# Patient Record
Sex: FEMALE | Race: ASIAN | NOT HISPANIC OR LATINO | Employment: FULL TIME | URBAN - METROPOLITAN AREA
[De-identification: names, ages, dates, MRNs, and addresses within clinical notes are randomized per-mention and may not be internally consistent; named-entity substitution may affect disease eponyms.]

---

## 2017-01-23 DIAGNOSIS — R19.7 DIARRHEA, UNSPECIFIED TYPE: Primary | ICD-10-CM

## 2017-01-23 RX ORDER — CIPROFLOXACIN 500 MG/1
500 TABLET, FILM COATED ORAL 2 TIMES DAILY
Qty: 20 TABLET | Refills: 0 | Status: SHIPPED | OUTPATIENT
Start: 2017-01-23 | End: 2017-02-02

## 2018-03-25 RX ORDER — DESONIDE 0.5 MG/G
OINTMENT TOPICAL 3 TIMES DAILY
Qty: 60 G | Refills: 5 | Status: SHIPPED | OUTPATIENT
Start: 2018-03-25 | End: 2018-04-05 | Stop reason: SDUPTHER

## 2018-04-05 RX ORDER — DESONIDE 0.5 MG/G
OINTMENT TOPICAL 3 TIMES DAILY
Qty: 60 G | Refills: 5 | Status: SHIPPED | OUTPATIENT
Start: 2018-04-05 | End: 2020-09-21 | Stop reason: SDUPTHER

## 2018-05-17 LAB
ALBUMIN SERPL-MCNC: 4.8 G/DL (ref 3.5–5.5)
ALBUMIN/GLOB SERPL: 1.8 {RATIO} (ref 1.2–2.2)
ALP SERPL-CCNC: 49 IU/L (ref 39–117)
ALT SERPL-CCNC: 12 IU/L (ref 0–32)
AST SERPL-CCNC: 15 IU/L (ref 0–40)
BASOPHILS # BLD AUTO: 0 X10E3/UL (ref 0–0.2)
BASOPHILS NFR BLD AUTO: 1 %
BILIRUB SERPL-MCNC: 0.2 MG/DL (ref 0–1.2)
BUN SERPL-MCNC: 13 MG/DL (ref 6–20)
BUN/CREAT SERPL: 19 (ref 9–23)
CALCIUM SERPL-MCNC: 9.7 MG/DL (ref 8.7–10.2)
CHLORIDE SERPL-SCNC: 100 MMOL/L (ref 96–106)
CO2 SERPL-SCNC: 21 MMOL/L (ref 18–29)
CREAT SERPL-MCNC: 0.68 MG/DL (ref 0.57–1)
EOSINOPHIL # BLD AUTO: 0.2 X10E3/UL (ref 0–0.4)
EOSINOPHIL NFR BLD AUTO: 3 %
ERYTHROCYTE [DISTWIDTH] IN BLOOD BY AUTOMATED COUNT: 13.5 % (ref 12.3–15.4)
GFR SERPLBLD CREATININE-BSD FMLA CKD-EPI: 120 ML/MIN/1.73
GFR SERPLBLD CREATININE-BSD FMLA CKD-EPI: 139 ML/MIN/1.73
GLOBULIN SER CALC-MCNC: 2.7 G/DL (ref 1.5–4.5)
GLUCOSE SERPL-MCNC: 92 MG/DL (ref 65–99)
HCT VFR BLD AUTO: 43.6 % (ref 34–46.6)
HGB BLD-MCNC: 14.8 G/DL (ref 11.1–15.9)
IMM GRANULOCYTES # BLD: 0 X10E3/UL (ref 0–0.1)
IMM GRANULOCYTES NFR BLD: 0 %
LYMPHOCYTES # BLD AUTO: 2.1 X10E3/UL (ref 0.7–3.1)
LYMPHOCYTES NFR BLD AUTO: 30 %
MCH RBC QN AUTO: 30.8 PG (ref 26.6–33)
MCHC RBC AUTO-ENTMCNC: 33.9 G/DL (ref 31.5–35.7)
MCV RBC AUTO: 91 FL (ref 79–97)
MONOCYTES # BLD AUTO: 0.6 X10E3/UL (ref 0.1–0.9)
MONOCYTES NFR BLD AUTO: 8 %
NEUTROPHILS # BLD AUTO: 4.3 X10E3/UL (ref 1.4–7)
NEUTROPHILS NFR BLD AUTO: 58 %
PLATELET # BLD AUTO: 288 X10E3/UL (ref 150–379)
POTASSIUM SERPL-SCNC: 4.3 MMOL/L (ref 3.5–5.2)
PROT SERPL-MCNC: 7.5 G/DL (ref 6–8.5)
RBC # BLD AUTO: 4.81 X10E6/UL (ref 3.77–5.28)
SODIUM SERPL-SCNC: 138 MMOL/L (ref 134–144)
T4 FREE SERPL-MCNC: 1.59 NG/DL (ref 0.82–1.77)
TSH SERPL DL<=0.005 MIU/L-ACNC: 1.78 UIU/ML (ref 0.45–4.5)
WBC # BLD AUTO: 7.2 X10E3/UL (ref 3.4–10.8)

## 2020-09-21 RX ORDER — DESONIDE 0.5 MG/G
OINTMENT TOPICAL 3 TIMES DAILY
Qty: 60 G | Refills: 1 | Status: SHIPPED | OUTPATIENT
Start: 2020-09-21 | End: 2020-11-25

## 2022-01-14 RX ORDER — LEVOFLOXACIN 500 MG/1
500 TABLET, FILM COATED ORAL DAILY
Qty: 10 TABLET | Refills: 0 | Status: SHIPPED | OUTPATIENT
Start: 2022-01-14 | End: 2022-01-24

## 2022-01-14 RX ORDER — LEVOFLOXACIN 500 MG/1
500 TABLET, FILM COATED ORAL DAILY
Qty: 10 TABLET | Refills: 0 | Status: SHIPPED | OUTPATIENT
Start: 2022-01-14 | End: 2022-01-14 | Stop reason: SDUPTHER

## 2022-01-14 RX ORDER — OFLOXACIN 3 MG/ML
SOLUTION/ DROPS OPHTHALMIC
Qty: 10 ML | Refills: 0 | Status: SHIPPED | OUTPATIENT
Start: 2022-01-14 | End: 2022-03-08

## 2022-01-14 RX ORDER — OFLOXACIN 3 MG/ML
SOLUTION/ DROPS OPHTHALMIC
Qty: 10 ML | Refills: 0 | Status: SHIPPED | OUTPATIENT
Start: 2022-01-14 | End: 2022-01-14 | Stop reason: SDUPTHER

## 2022-03-08 ENCOUNTER — OFFICE VISIT (OUTPATIENT)
Dept: FAMILY MEDICINE CLINIC | Facility: CLINIC | Age: 32
End: 2022-03-08

## 2022-03-08 VITALS
WEIGHT: 112 LBS | TEMPERATURE: 98.1 F | OXYGEN SATURATION: 100 % | BODY MASS INDEX: 21.14 KG/M2 | HEART RATE: 75 BPM | SYSTOLIC BLOOD PRESSURE: 144 MMHG | DIASTOLIC BLOOD PRESSURE: 82 MMHG | HEIGHT: 61 IN

## 2022-03-08 DIAGNOSIS — Z13.0 SCREENING, ANEMIA, DEFICIENCY, IRON: ICD-10-CM

## 2022-03-08 DIAGNOSIS — Z13.1 SCREENING FOR DIABETES MELLITUS: ICD-10-CM

## 2022-03-08 DIAGNOSIS — K76.89 FOCAL NODULAR HYPERPLASIA OF LIVER: ICD-10-CM

## 2022-03-08 DIAGNOSIS — Z13.220 SCREENING FOR LIPID DISORDERS: ICD-10-CM

## 2022-03-08 DIAGNOSIS — Z00.00 HEALTH MAINTENANCE EXAMINATION: Primary | ICD-10-CM

## 2022-03-08 DIAGNOSIS — Z13.29 SCREENING FOR THYROID DISORDER: ICD-10-CM

## 2022-03-08 DIAGNOSIS — Z11.59 ENCOUNTER FOR HEPATITIS C SCREENING TEST FOR LOW RISK PATIENT: ICD-10-CM

## 2022-03-08 PROBLEM — N83.202 BILATERAL OVARIAN CYSTS: Status: ACTIVE | Noted: 2022-03-08

## 2022-03-08 PROBLEM — L20.82 FLEXURAL ECZEMA: Status: ACTIVE | Noted: 2022-03-08

## 2022-03-08 PROBLEM — N83.201 BILATERAL OVARIAN CYSTS: Status: ACTIVE | Noted: 2022-03-08

## 2022-03-08 PROBLEM — L40.9 PSORIASIS: Status: ACTIVE | Noted: 2022-03-08

## 2022-03-08 PROCEDURE — 99395 PREV VISIT EST AGE 18-39: CPT | Performed by: FAMILY MEDICINE

## 2022-03-08 RX ORDER — CLOBETASOL PROPIONATE 0.5 MG/G
1 OINTMENT TOPICAL 2 TIMES DAILY
COMMUNITY
End: 2023-03-10

## 2022-03-08 NOTE — PROGRESS NOTES
"Chief Complaint  Establish Care (New pt establishing today with dr carrera ) and Annual Exam (No complains  doing well /)    Subjective          Tania FERGUSON presents to [facility]  History of Present Illness  Annual Exam.    Health Maintenance   Topic Date Due   • ANNUAL PHYSICAL  Never done   • HEPATITIS C SCREENING  Never done   • PAP SMEAR  01/20/2024   • TDAP/TD VACCINES (2 - Td or Tdap) 09/10/2031   • COVID-19 Vaccine  Completed   • INFLUENZA VACCINE  Completed   • Pneumococcal Vaccine 0-64  Aged Out       Diet: eats take out regularly   Exercise: started exercising now, barre work outs, About 20 min a day.   GYN: has an IUD in place, no abnormal paps, regular menses, has GYN   Immunizations: UTD  Colon cancer screening: UTD   Sleep/mental health: UTD   Dentist: UTD    Objective   Vital Signs:   /82   Pulse 75   Temp 98.1 °F (36.7 °C)   Ht 154.9 cm (61\")   Wt 50.8 kg (112 lb)   SpO2 100%   BMI 21.16 kg/m²     Physical Exam  Vitals and nursing note reviewed.   Constitutional:       General: She is not in acute distress.     Appearance: She is well-developed.   HENT:      Head: Normocephalic.      Right Ear: Tympanic membrane and ear canal normal.      Left Ear: Tympanic membrane and ear canal normal.      Ears:      Comments: Mild atopic dermatitis in the ear canals bilaterally.     Nose: Nose normal.   Cardiovascular:      Rate and Rhythm: Normal rate and regular rhythm.      Heart sounds: Normal heart sounds. No murmur heard.  Pulmonary:      Effort: Pulmonary effort is normal. No respiratory distress.      Breath sounds: Normal breath sounds.   Abdominal:      General: Abdomen is flat. Bowel sounds are normal. There is no distension.      Palpations: Abdomen is soft.      Tenderness: There is no abdominal tenderness.   Musculoskeletal:         General: Normal range of motion.   Skin:     General: Skin is warm and dry.      Findings: No rash.   Neurological:      " Mental Status: She is alert and oriented to person, place, and time.   Psychiatric:         Behavior: Behavior normal.         Thought Content: Thought content normal.         Judgment: Judgment normal.        Result Review :                 Assessment and Plan    Diagnoses and all orders for this visit:    1. Health maintenance examination (Primary)    2. Screening for diabetes mellitus  -     Comprehensive Metabolic Panel    3. Screening for lipid disorders  -     Lipid Panel    4. Screening for thyroid disorder  -     TSH Rfx On Abnormal To Free T4    5. Encounter for hepatitis C screening test for low risk patient  -     Hepatitis C Antibody    6. Screening, anemia, deficiency, iron  -     CBC & Differential    7. Focal nodular hyperplasia of liver  Assessment & Plan:  Started havving symptoms of RUQ abdominal, dark tarry stools in 2015, she has saw Dr Conner -underwent colonoscopy and EGD in 2014, MRI of the abdomen showing for hepatic lesions in 2015, her last scan was April 18, 2016.  The lesions were stable, demonstrating focal nodular hyperplasia.  Attributed to oral contraceptives, since stopping the OCPs her symptoms have resolved.  Now using Francisca for contraception.    Plan:   -Observe for recurrent symptoms, reimage if they return.  -Avoid OCPs as this was likely cause.      Here for annual exam, fasting labs ordered today, immunizations up-to-date, colon cancer screening not indicated, GYN health UTD, cardiovascular screening  UTD, counseled patient to increase vegetable intake, water and 150 minutes of exercise weekly combining weightbearing exercises and aerobic activity.    Her blood pressure was mildly elevated today, states that it normally is elevated in the doctor's office but better at home.  Continue to observe for now.    Follow Up   Return if symptoms worsen or fail to improve, for Annual Exam with fasting labs prior.  Patient was given instructions and counseling regarding her condition or  for health maintenance advice. Please see specific information pulled into the AVS if appropriate. Medical assistant and I wore mask and eyewear protection during entire encounter.  Patient wore mask.    Gloria Barajas MD

## 2022-03-08 NOTE — ASSESSMENT & PLAN NOTE
Started havving symptoms of RUQ abdominal, dark tarry stools in 2015, she has saw Dr Conner -underwent colonoscopy and EGD in 2014, MRI of the abdomen showing for hepatic lesions in 2015, her last scan was April 18, 2016.  The lesions were stable, demonstrating focal nodular hyperplasia.  Attributed to oral contraceptives, since stopping the OCPs her symptoms have resolved.  Now using Francisca for contraception.    Plan:   -Observe for recurrent symptoms, reimage if they return.  -Avoid OCPs as this was likely cause.

## 2022-03-08 NOTE — ASSESSMENT & PLAN NOTE
She has had allergy teesting, elimination testing and found she is allergic to haroldo and formaldehyde and that has helped.  Derm: [facility]

## 2022-03-09 LAB
ALBUMIN SERPL-MCNC: 5 G/DL (ref 3.8–4.8)
ALBUMIN/GLOB SERPL: 1.9 {RATIO} (ref 1.2–2.2)
ALP SERPL-CCNC: 51 IU/L (ref 44–121)
ALT SERPL-CCNC: 13 IU/L (ref 0–32)
AST SERPL-CCNC: 18 IU/L (ref 0–40)
BASOPHILS # BLD AUTO: 0.1 X10E3/UL (ref 0–0.2)
BASOPHILS NFR BLD AUTO: 1 %
BILIRUB SERPL-MCNC: 0.5 MG/DL (ref 0–1.2)
BUN SERPL-MCNC: 11 MG/DL (ref 6–20)
BUN/CREAT SERPL: 16 (ref 9–23)
CALCIUM SERPL-MCNC: 10.2 MG/DL (ref 8.7–10.2)
CHLORIDE SERPL-SCNC: 103 MMOL/L (ref 96–106)
CHOLEST SERPL-MCNC: 194 MG/DL (ref 100–199)
CO2 SERPL-SCNC: 23 MMOL/L (ref 20–29)
CREAT SERPL-MCNC: 0.69 MG/DL (ref 0.57–1)
EGFR GENE MUT ANL BLD/T: 119 ML/MIN/1.73
EOSINOPHIL # BLD AUTO: 0.2 X10E3/UL (ref 0–0.4)
EOSINOPHIL NFR BLD AUTO: 2 %
ERYTHROCYTE [DISTWIDTH] IN BLOOD BY AUTOMATED COUNT: 12.7 % (ref 11.7–15.4)
GLOBULIN SER CALC-MCNC: 2.6 G/DL (ref 1.5–4.5)
GLUCOSE SERPL-MCNC: 95 MG/DL (ref 65–99)
HCT VFR BLD AUTO: 45 % (ref 34–46.6)
HCV AB S/CO SERPL IA: <0.1 S/CO RATIO (ref 0–0.9)
HDLC SERPL-MCNC: 87 MG/DL
HGB BLD-MCNC: 15.1 G/DL (ref 11.1–15.9)
IMM GRANULOCYTES # BLD AUTO: 0 X10E3/UL (ref 0–0.1)
IMM GRANULOCYTES NFR BLD AUTO: 1 %
LDLC SERPL CALC-MCNC: 91 MG/DL (ref 0–99)
LYMPHOCYTES # BLD AUTO: 1.6 X10E3/UL (ref 0.7–3.1)
LYMPHOCYTES NFR BLD AUTO: 26 %
MCH RBC QN AUTO: 31 PG (ref 26.6–33)
MCHC RBC AUTO-ENTMCNC: 33.6 G/DL (ref 31.5–35.7)
MCV RBC AUTO: 92 FL (ref 79–97)
MONOCYTES # BLD AUTO: 0.4 X10E3/UL (ref 0.1–0.9)
MONOCYTES NFR BLD AUTO: 6 %
NEUTROPHILS # BLD AUTO: 4 X10E3/UL (ref 1.4–7)
NEUTROPHILS NFR BLD AUTO: 64 %
PLATELET # BLD AUTO: 282 X10E3/UL (ref 150–450)
POTASSIUM SERPL-SCNC: 5.3 MMOL/L (ref 3.5–5.2)
PROT SERPL-MCNC: 7.6 G/DL (ref 6–8.5)
RBC # BLD AUTO: 4.87 X10E6/UL (ref 3.77–5.28)
SODIUM SERPL-SCNC: 140 MMOL/L (ref 134–144)
TRIGL SERPL-MCNC: 93 MG/DL (ref 0–149)
TSH SERPL DL<=0.005 MIU/L-ACNC: 1.21 UIU/ML (ref 0.45–4.5)
VLDLC SERPL CALC-MCNC: 16 MG/DL (ref 5–40)
WBC # BLD AUTO: 6.3 X10E3/UL (ref 3.4–10.8)

## 2022-03-17 ENCOUNTER — PATIENT ROUNDING (BHMG ONLY) (OUTPATIENT)
Dept: FAMILY MEDICINE CLINIC | Facility: CLINIC | Age: 32
End: 2022-03-17

## 2022-03-28 NOTE — PROGRESS NOTES
Please call patient with results. Kidney and liver functions look normal. Diabetes screen is negative. Cholesterol is normal.  Normal cholesterol, thyroid function, blood counts and negative hepatitis C screen.     Your potassium is slightly elevated.  I would stop any potassium supplement, possibly limit intake of some foods that are high in potassium.      Please continue with routine follow-up and let us know if you have further questions.      Thank You,    Gloria Barajas M.D.

## 2022-05-25 ENCOUNTER — TELEPHONE (OUTPATIENT)
Dept: FAMILY MEDICINE CLINIC | Facility: CLINIC | Age: 32
End: 2022-05-25

## 2023-03-10 ENCOUNTER — OFFICE VISIT (OUTPATIENT)
Dept: FAMILY MEDICINE CLINIC | Facility: CLINIC | Age: 33
End: 2023-03-10
Payer: COMMERCIAL

## 2023-03-10 VITALS
BODY MASS INDEX: 21.71 KG/M2 | OXYGEN SATURATION: 99 % | TEMPERATURE: 97.8 F | HEIGHT: 61 IN | WEIGHT: 115 LBS | SYSTOLIC BLOOD PRESSURE: 110 MMHG | HEART RATE: 70 BPM | DIASTOLIC BLOOD PRESSURE: 70 MMHG

## 2023-03-10 DIAGNOSIS — Z13.29 SCREENING FOR THYROID DISORDER: ICD-10-CM

## 2023-03-10 DIAGNOSIS — Z00.00 HEALTH MAINTENANCE EXAMINATION: Primary | ICD-10-CM

## 2023-03-10 DIAGNOSIS — Z13.1 SCREENING FOR DIABETES MELLITUS: ICD-10-CM

## 2023-03-10 DIAGNOSIS — Z13.0 SCREENING, ANEMIA, DEFICIENCY, IRON: ICD-10-CM

## 2023-03-10 DIAGNOSIS — Z13.220 SCREENING FOR LIPID DISORDERS: ICD-10-CM

## 2023-03-10 PROCEDURE — 99395 PREV VISIT EST AGE 18-39: CPT | Performed by: FAMILY MEDICINE

## 2023-03-10 NOTE — PROGRESS NOTES
"Chief Complaint  Annual Exam (No complains doing well  feel skin is getting lot dryer specially on face )    Subjective        Tania FERGUSON presents to [facility]  History of Present Illness  Annual Exam.    Health Maintenance   Topic Date Due   • INFLUENZA VACCINE  08/01/2022   • ANNUAL PHYSICAL  03/08/2023   • PAP SMEAR  01/20/2024   • TDAP/TD VACCINES (2 - Td or Tdap) 09/10/2031   • HEPATITIS C SCREENING  Completed   • COVID-19 Vaccine  Completed   • Pneumococcal Vaccine 0-64  Aged Out     Diet: eating well  Exercise: exercising   GYN: irregular menses with GYN, had IUD - Francisca   Immunizations: UTD   Colon cancer screening: Due at 45 (she did have a scope 2./2 rectal bleeding that was normal)  Sleep/mental health: doing well, some some stress with work, but otherwise doing well.  Dentist: UTD  Vision no concerns up-to-date  Derm: On Dupixent now ans helping her skin.  Sees her Derm    Objective   Vital Signs:  /70   Pulse 70   Temp 97.8 °F (36.6 °C)   Ht 154.9 cm (61\")   Wt 52.2 kg (115 lb)   SpO2 99%   BMI 21.73 kg/m²   Estimated body mass index is 21.73 kg/m² as calculated from the following:    Height as of this encounter: 154.9 cm (61\").    Weight as of this encounter: 52.2 kg (115 lb).       BMI is within normal parameters. No other follow-up for BMI required.      Physical Exam  Vitals and nursing note reviewed.   Constitutional:       General: She is not in acute distress.     Appearance: She is well-developed.   HENT:      Head: Normocephalic.      Nose: Nose normal.   Cardiovascular:      Rate and Rhythm: Normal rate and regular rhythm.      Heart sounds: Normal heart sounds. No murmur heard.  Pulmonary:      Effort: Pulmonary effort is normal. No respiratory distress.      Breath sounds: Normal breath sounds.   Musculoskeletal:         General: Normal range of motion.   Skin:     General: Skin is warm and dry.      Findings: No rash.   Neurological:      " Mental Status: She is alert and oriented to person, place, and time.   Psychiatric:         Behavior: Behavior normal.         Thought Content: Thought content normal.         Judgment: Judgment normal.        Result Review :                   Assessment and Plan   Diagnoses and all orders for this visit:    1. Health maintenance examination (Primary)    2. Screening for diabetes mellitus  -     Comprehensive Metabolic Panel    3. Screening for lipid disorders  -     Lipid Panel    4. Screening for thyroid disorder  -     TSH Rfx On Abnormal To Free T4    5. Screening, anemia, deficiency, iron  -     CBC & Differential      Here for annual exam, fasting labs ordered as above, immunizations up-to-date, colon cancer screening due at 45, she has had 1 colonoscopy which was normal due to some rectal bleeding years ago, GYN health up-to-date with her gynecologist Dr. Agustin, IUD in place for contraception, she may want to start considering pregnancy again this year, cardiovascular screening up-to-date and no concerns, counseled patient to increase vegetable intake, water and 150 minutes of exercise weekly combining weightbearing exercises and aerobic activity.         Follow Up   Return in about 1 year (around 3/10/2024), or if symptoms worsen or fail to improve, for Annual Physical with fasting labs prior.  Patient was given instructions and counseling regarding her condition or for health maintenance advice. Please see specific information pulled into the AVS if appropriate. Medical assistant and I wore mask and eyewear protection during entire encounter.  Patient wore mask.    Gloria Herbert MD

## 2023-03-11 LAB
ALBUMIN SERPL-MCNC: 4.8 G/DL (ref 3.5–5.2)
ALBUMIN/GLOB SERPL: 1.9 G/DL
ALP SERPL-CCNC: 52 U/L (ref 39–117)
ALT SERPL-CCNC: 9 U/L (ref 1–33)
AST SERPL-CCNC: 19 U/L (ref 1–32)
BASOPHILS # BLD AUTO: 0.05 10*3/MM3 (ref 0–0.2)
BASOPHILS NFR BLD AUTO: 0.9 % (ref 0–1.5)
BILIRUB SERPL-MCNC: 0.3 MG/DL (ref 0–1.2)
BUN SERPL-MCNC: 12 MG/DL (ref 6–20)
BUN/CREAT SERPL: 19.7 (ref 7–25)
CALCIUM SERPL-MCNC: 9.5 MG/DL (ref 8.6–10.5)
CHLORIDE SERPL-SCNC: 101 MMOL/L (ref 98–107)
CHOLEST SERPL-MCNC: 188 MG/DL (ref 0–200)
CO2 SERPL-SCNC: 26 MMOL/L (ref 22–29)
CREAT SERPL-MCNC: 0.61 MG/DL (ref 0.57–1)
EGFRCR SERPLBLD CKD-EPI 2021: 122 ML/MIN/1.73
EOSINOPHIL # BLD AUTO: 0.08 10*3/MM3 (ref 0–0.4)
EOSINOPHIL NFR BLD AUTO: 1.4 % (ref 0.3–6.2)
ERYTHROCYTE [DISTWIDTH] IN BLOOD BY AUTOMATED COUNT: 12.5 % (ref 12.3–15.4)
GLOBULIN SER CALC-MCNC: 2.5 GM/DL
GLUCOSE SERPL-MCNC: 98 MG/DL (ref 65–99)
HCT VFR BLD AUTO: 43.2 % (ref 34–46.6)
HDLC SERPL-MCNC: 79 MG/DL (ref 40–60)
HGB BLD-MCNC: 14.8 G/DL (ref 12–15.9)
IMM GRANULOCYTES # BLD AUTO: 0.01 10*3/MM3 (ref 0–0.05)
IMM GRANULOCYTES NFR BLD AUTO: 0.2 % (ref 0–0.5)
LDLC SERPL CALC-MCNC: 93 MG/DL (ref 0–100)
LYMPHOCYTES # BLD AUTO: 1.59 10*3/MM3 (ref 0.7–3.1)
LYMPHOCYTES NFR BLD AUTO: 27.1 % (ref 19.6–45.3)
MCH RBC QN AUTO: 31.8 PG (ref 26.6–33)
MCHC RBC AUTO-ENTMCNC: 34.3 G/DL (ref 31.5–35.7)
MCV RBC AUTO: 92.7 FL (ref 79–97)
MONOCYTES # BLD AUTO: 0.4 10*3/MM3 (ref 0.1–0.9)
MONOCYTES NFR BLD AUTO: 6.8 % (ref 5–12)
NEUTROPHILS # BLD AUTO: 3.73 10*3/MM3 (ref 1.7–7)
NEUTROPHILS NFR BLD AUTO: 63.6 % (ref 42.7–76)
NRBC BLD AUTO-RTO: 0 /100 WBC (ref 0–0.2)
PLATELET # BLD AUTO: 251 10*3/MM3 (ref 140–450)
POTASSIUM SERPL-SCNC: 5 MMOL/L (ref 3.5–5.2)
PROT SERPL-MCNC: 7.3 G/DL (ref 6–8.5)
RBC # BLD AUTO: 4.66 10*6/MM3 (ref 3.77–5.28)
SODIUM SERPL-SCNC: 137 MMOL/L (ref 136–145)
TRIGL SERPL-MCNC: 91 MG/DL (ref 0–150)
TSH SERPL DL<=0.005 MIU/L-ACNC: 0.99 UIU/ML (ref 0.27–4.2)
VLDLC SERPL CALC-MCNC: 16 MG/DL (ref 5–40)
WBC # BLD AUTO: 5.86 10*3/MM3 (ref 3.4–10.8)

## 2023-03-16 NOTE — PROGRESS NOTES
Spoke to Tania FERGUSON about results. Patient voiced understanding and will call if they have any questions.

## 2023-03-16 NOTE — PROGRESS NOTES
Please call patient back with results.  The labs has resulted as normal kidney and liver function, excellent cholesterol and thyroid.  No abnormal findings continue with healthful living and follow-up in 1 year.  Please let us know if you have further questions.     Thank you

## 2024-03-15 ENCOUNTER — OFFICE VISIT (OUTPATIENT)
Dept: FAMILY MEDICINE CLINIC | Facility: CLINIC | Age: 34
End: 2024-03-15
Payer: COMMERCIAL

## 2024-03-15 VITALS
HEIGHT: 61 IN | HEART RATE: 55 BPM | WEIGHT: 112 LBS | SYSTOLIC BLOOD PRESSURE: 110 MMHG | OXYGEN SATURATION: 99 % | DIASTOLIC BLOOD PRESSURE: 70 MMHG | TEMPERATURE: 98 F | BODY MASS INDEX: 21.14 KG/M2

## 2024-03-15 DIAGNOSIS — Z13.29 SCREENING FOR THYROID DISORDER: ICD-10-CM

## 2024-03-15 DIAGNOSIS — Z00.00 HEALTH MAINTENANCE EXAMINATION: Primary | ICD-10-CM

## 2024-03-15 DIAGNOSIS — L30.9 DERMATITIS: ICD-10-CM

## 2024-03-15 DIAGNOSIS — Z13.1 SCREENING FOR DIABETES MELLITUS: ICD-10-CM

## 2024-03-15 DIAGNOSIS — Z13.0 SCREENING, ANEMIA, DEFICIENCY, IRON: ICD-10-CM

## 2024-03-15 DIAGNOSIS — Z13.220 SCREENING FOR LIPID DISORDERS: ICD-10-CM

## 2024-03-15 LAB
ALBUMIN SERPL-MCNC: 4.5 G/DL (ref 3.5–5.2)
ALBUMIN/GLOB SERPL: 2 G/DL
ALP SERPL-CCNC: 40 U/L (ref 39–117)
ALT SERPL-CCNC: 13 U/L (ref 1–33)
AST SERPL-CCNC: 14 U/L (ref 1–32)
BASOPHILS # BLD AUTO: 0.04 10*3/MM3 (ref 0–0.2)
BASOPHILS NFR BLD AUTO: 0.7 % (ref 0–1.5)
BILIRUB SERPL-MCNC: 0.5 MG/DL (ref 0–1.2)
BUN SERPL-MCNC: 11 MG/DL (ref 6–20)
BUN/CREAT SERPL: 14.1 (ref 7–25)
CALCIUM SERPL-MCNC: 9.2 MG/DL (ref 8.6–10.5)
CHLORIDE SERPL-SCNC: 105 MMOL/L (ref 98–107)
CHOLEST SERPL-MCNC: 161 MG/DL (ref 0–200)
CO2 SERPL-SCNC: 22.3 MMOL/L (ref 22–29)
CREAT SERPL-MCNC: 0.78 MG/DL (ref 0.57–1)
EGFRCR SERPLBLD CKD-EPI 2021: 103 ML/MIN/1.73
EOSINOPHIL # BLD AUTO: 0.07 10*3/MM3 (ref 0–0.4)
EOSINOPHIL NFR BLD AUTO: 1.2 % (ref 0.3–6.2)
ERYTHROCYTE [DISTWIDTH] IN BLOOD BY AUTOMATED COUNT: 12.1 % (ref 12.3–15.4)
GLOBULIN SER CALC-MCNC: 2.3 GM/DL
GLUCOSE SERPL-MCNC: 100 MG/DL (ref 65–99)
HCT VFR BLD AUTO: 40.4 % (ref 34–46.6)
HDLC SERPL-MCNC: 72 MG/DL (ref 40–60)
HGB BLD-MCNC: 13.6 G/DL (ref 12–15.9)
IMM GRANULOCYTES # BLD AUTO: 0.01 10*3/MM3 (ref 0–0.05)
IMM GRANULOCYTES NFR BLD AUTO: 0.2 % (ref 0–0.5)
LDLC SERPL CALC-MCNC: 72 MG/DL (ref 0–100)
LYMPHOCYTES # BLD AUTO: 1.53 10*3/MM3 (ref 0.7–3.1)
LYMPHOCYTES NFR BLD AUTO: 25.8 % (ref 19.6–45.3)
MCH RBC QN AUTO: 31.1 PG (ref 26.6–33)
MCHC RBC AUTO-ENTMCNC: 33.7 G/DL (ref 31.5–35.7)
MCV RBC AUTO: 92.4 FL (ref 79–97)
MONOCYTES # BLD AUTO: 0.47 10*3/MM3 (ref 0.1–0.9)
MONOCYTES NFR BLD AUTO: 7.9 % (ref 5–12)
NEUTROPHILS # BLD AUTO: 3.82 10*3/MM3 (ref 1.7–7)
NEUTROPHILS NFR BLD AUTO: 64.2 % (ref 42.7–76)
NRBC BLD AUTO-RTO: 0 /100 WBC (ref 0–0.2)
PLATELET # BLD AUTO: 244 10*3/MM3 (ref 140–450)
POTASSIUM SERPL-SCNC: 4.7 MMOL/L (ref 3.5–5.2)
PROT SERPL-MCNC: 6.8 G/DL (ref 6–8.5)
RBC # BLD AUTO: 4.37 10*6/MM3 (ref 3.77–5.28)
SODIUM SERPL-SCNC: 140 MMOL/L (ref 136–145)
TRIGL SERPL-MCNC: 95 MG/DL (ref 0–150)
TSH SERPL DL<=0.005 MIU/L-ACNC: 1.3 UIU/ML (ref 0.27–4.2)
VLDLC SERPL CALC-MCNC: 17 MG/DL (ref 5–40)
WBC # BLD AUTO: 5.94 10*3/MM3 (ref 3.4–10.8)

## 2024-03-15 PROCEDURE — 99395 PREV VISIT EST AGE 18-39: CPT | Performed by: FAMILY MEDICINE

## 2024-03-15 RX ORDER — TRIAMCINOLONE ACETONIDE 5 MG/G
1 OINTMENT TOPICAL 2 TIMES DAILY
Qty: 30 G | Refills: 2 | Status: SHIPPED | OUTPATIENT
Start: 2024-03-15

## 2024-03-15 NOTE — PROGRESS NOTES
"Chief Complaint  Annual Exam (Physical - fasting - NOLBERTO 5 PHQ 0/IUD removed, trying to conceive ) and Rash (Rash on blateral lower arms after trying new lotion 3/5/24, rash 2D later. Not itchy or painful )    Subjective        Tania FERGUSON presents to [facility]  History of Present Illness  Annual Exam.    Health Maintenance   Topic Date Due    COVID-19 Vaccine (5 - 2023-24 season) 09/01/2023    INFLUENZA VACCINE  03/31/2024 (Originally 8/1/2023)    ANNUAL PHYSICAL  03/15/2025    PAP SMEAR  08/01/2026    TDAP/TD VACCINES (2 - Td or Tdap) 09/10/2031    HEPATITIS C SCREENING  Completed    Pneumococcal Vaccine 0-64  Aged Out       Diet: eating healthy   Exercise: exercising   GYN: Up-to-date with her gynecologist, this summer her Pap smear was performed, IUD removed, hoping to get pregnant.  But not pregnant yet.  Normal menses no concerns  Immunizations: Overall up-to-date, missed flu shot this year, may get next year  Colon cancer screening: Not due yet until 45  Sleep/mental health: mild anxiety - sleeping well.  She does have a 3-year-old son but he has a good situation with school, financially doing well, may consider transitioning work.  Dentist: UTD   Vision: UTD   Derm: she tried a new lotion a couple weeks ago.  She applied it to her whole body but her hands on both sides broke out into a rash and have become hypopigmented.  She is heading to the beach in a few weeks and wondering if there would be something to help resolve it, she would prefer not to do systemic steroids.    Objective   Vital Signs:  /70   Pulse 55   Temp 98 °F (36.7 °C)   Ht 154.9 cm (60.98\")   Wt 50.8 kg (112 lb)   SpO2 99%   BMI 21.17 kg/m²   Estimated body mass index is 21.17 kg/m² as calculated from the following:    Height as of this encounter: 154.9 cm (60.98\").    Weight as of this encounter: 50.8 kg (112 lb).       BMI is within normal parameters. No other follow-up for BMI " required.      Physical Exam  Vitals and nursing note reviewed.   Constitutional:       General: She is not in acute distress.     Appearance: Normal appearance. She is well-developed.   HENT:      Head: Normocephalic.      Right Ear: Tympanic membrane and ear canal normal. There is no impacted cerumen.      Left Ear: Tympanic membrane and ear canal normal. There is no impacted cerumen.      Nose: Nose normal.   Eyes:      Conjunctiva/sclera: Conjunctivae normal.      Pupils: Pupils are equal, round, and reactive to light.   Neck:      Vascular: No carotid bruit.   Cardiovascular:      Rate and Rhythm: Normal rate and regular rhythm.      Heart sounds: Normal heart sounds. No murmur heard.  Pulmonary:      Effort: Pulmonary effort is normal. No respiratory distress.      Breath sounds: Normal breath sounds.   Abdominal:      General: Abdomen is flat. Bowel sounds are normal. There is no distension.      Tenderness: There is no abdominal tenderness.   Musculoskeletal:         General: Normal range of motion.   Skin:     General: Skin is warm and dry.      Findings: No rash.   Neurological:      Mental Status: She is alert and oriented to person, place, and time.   Psychiatric:         Behavior: Behavior normal.         Thought Content: Thought content normal.         Judgment: Judgment normal.        Result Review :                     Assessment and Plan     Diagnoses and all orders for this visit:    1. Health maintenance examination (Primary)    2. Screening for diabetes mellitus  -     Comprehensive Metabolic Panel    3. Screening for lipid disorders  -     Lipid Panel    4. Screening, anemia, deficiency, iron  -     CBC & Differential    5. Screening for thyroid disorder  -     TSH Rfx On Abnormal To Free T4    6. Dermatitis  -     triamcinolone (KENALOG) 0.5 % ointment; Apply 1 Application topically to the appropriate area as directed 2 (Two) Times a Day.  Dispense: 30 g; Refill: 2    Here for annual exam,  fasting labs ordered as above, immunizations up-to-date, colon cancer screening up-to-date, GYN health up-to-date this August, hoping to get pregnant, cardiovascular screening negative for symptoms, counseled patient to increase vegetable intake, water and 150 minutes of exercise weekly combining weightbearing exercises and aerobic activity.    Dermatitis on her hands, it looks like an allergic reaction with some hyperpigmentation, possible keloid formation?  Will apply topical steroid as above if not improving refer to dermatology.       Follow Up     Return in about 1 year (around 3/15/2025), or if symptoms worsen or fail to improve, for Annual physical.  Patient was given instructions and counseling regarding her condition or for health maintenance advice. Please see specific information pulled into the AVS if appropriate.

## 2025-04-04 ENCOUNTER — READMISSION MANAGEMENT (OUTPATIENT)
Dept: CALL CENTER | Facility: HOSPITAL | Age: 35
End: 2025-04-04
Payer: COMMERCIAL

## 2025-04-04 NOTE — OUTREACH NOTE
Prep Survey      Flowsheet Row Responses   [facility] facility patient discharged from? Non-BH   Is LACE score < 7 ? Non-BH Discharge   Eligibility Not Eligible   What are the reasons patient is not eligible? Other  [OB patient]   Does the patient have one of the following disease processes/diagnoses(primary or secondary)? Other   Prep survey completed? Yes            Shruthi MARINO - Registered Nurse

## 2025-08-21 ENCOUNTER — OFFICE VISIT (OUTPATIENT)
Dept: FAMILY MEDICINE CLINIC | Facility: CLINIC | Age: 35
End: 2025-08-21
Payer: COMMERCIAL

## 2025-08-21 VITALS
DIASTOLIC BLOOD PRESSURE: 74 MMHG | TEMPERATURE: 97.6 F | HEIGHT: 61 IN | OXYGEN SATURATION: 97 % | SYSTOLIC BLOOD PRESSURE: 112 MMHG | HEART RATE: 62 BPM | WEIGHT: 115 LBS | BODY MASS INDEX: 21.71 KG/M2

## 2025-08-21 DIAGNOSIS — Z13.29 SCREENING FOR THYROID DISORDER: ICD-10-CM

## 2025-08-21 DIAGNOSIS — Z13.1 SCREENING FOR DIABETES MELLITUS: ICD-10-CM

## 2025-08-21 DIAGNOSIS — Z13.220 SCREENING FOR LIPID DISORDERS: ICD-10-CM

## 2025-08-21 DIAGNOSIS — N39.3 STRESS INCONTINENCE: ICD-10-CM

## 2025-08-21 DIAGNOSIS — Z98.891 S/P CESAREAN SECTION: ICD-10-CM

## 2025-08-21 DIAGNOSIS — Z00.00 HEALTH MAINTENANCE EXAMINATION: Primary | ICD-10-CM

## 2025-08-21 DIAGNOSIS — L90.5 SCAR TISSUE: ICD-10-CM

## 2025-08-21 DIAGNOSIS — Z13.0 SCREENING, ANEMIA, DEFICIENCY, IRON: ICD-10-CM

## 2025-08-21 DIAGNOSIS — R53.82 CHRONIC FATIGUE: ICD-10-CM

## 2025-08-21 DIAGNOSIS — F41.8 POSTPARTUM ANXIETY: ICD-10-CM

## 2025-08-21 RX ORDER — SERTRALINE HYDROCHLORIDE 25 MG/1
25 TABLET, FILM COATED ORAL DAILY
Qty: 30 TABLET | Refills: 5 | Status: SHIPPED | OUTPATIENT
Start: 2025-08-21

## 2025-08-21 RX ORDER — PRENATAL VIT NO.126/IRON/FOLIC 28MG-0.8MG
TABLET ORAL DAILY
COMMUNITY

## 2025-08-22 LAB
25(OH)D3+25(OH)D2 SERPL-MCNC: 42.4 NG/ML (ref 30–100)
ALBUMIN SERPL-MCNC: 4.6 G/DL (ref 3.5–5.2)
ALBUMIN/GLOB SERPL: 1.8 G/DL
ALP SERPL-CCNC: 64 U/L (ref 39–117)
ALT SERPL-CCNC: 15 U/L (ref 1–33)
AST SERPL-CCNC: 17 U/L (ref 1–32)
BASOPHILS # BLD AUTO: 0.05 10*3/MM3 (ref 0–0.2)
BASOPHILS NFR BLD AUTO: 1 % (ref 0–1.5)
BILIRUB SERPL-MCNC: 0.4 MG/DL (ref 0–1.2)
BUN SERPL-MCNC: 11 MG/DL (ref 6–20)
BUN/CREAT SERPL: 16.7 (ref 7–25)
CALCIUM SERPL-MCNC: 9.9 MG/DL (ref 8.6–10.5)
CHLORIDE SERPL-SCNC: 106 MMOL/L (ref 98–107)
CHOLEST SERPL-MCNC: 171 MG/DL (ref 0–200)
CO2 SERPL-SCNC: 25.3 MMOL/L (ref 22–29)
CREAT SERPL-MCNC: 0.66 MG/DL (ref 0.57–1)
EGFRCR SERPLBLD CKD-EPI 2021: 117.5 ML/MIN/1.73
EOSINOPHIL # BLD AUTO: 0.23 10*3/MM3 (ref 0–0.4)
EOSINOPHIL NFR BLD AUTO: 4.6 % (ref 0.3–6.2)
ERYTHROCYTE [DISTWIDTH] IN BLOOD BY AUTOMATED COUNT: 13.8 % (ref 12.3–15.4)
ESTROGEN SERPL-MCNC: 191 PG/ML
FOLATE SERPL-MCNC: >20 NG/ML (ref 4.78–24.2)
GLOBULIN SER CALC-MCNC: 2.6 GM/DL
GLUCOSE SERPL-MCNC: 88 MG/DL (ref 65–99)
HBA1C MFR BLD: 5.5 % (ref 4.8–5.6)
HCT VFR BLD AUTO: 42.4 % (ref 34–46.6)
HDLC SERPL-MCNC: 93 MG/DL (ref 40–60)
HGB BLD-MCNC: 13.8 G/DL (ref 12–15.9)
IMM GRANULOCYTES # BLD AUTO: 0 10*3/MM3 (ref 0–0.05)
IMM GRANULOCYTES NFR BLD AUTO: 0 % (ref 0–0.5)
IRON SATN MFR SERPL: 28 % (ref 20–50)
IRON SERPL-MCNC: 121 MCG/DL (ref 37–145)
LDLC SERPL CALC-MCNC: 67 MG/DL (ref 0–100)
LYMPHOCYTES # BLD AUTO: 1.44 10*3/MM3 (ref 0.7–3.1)
LYMPHOCYTES NFR BLD AUTO: 28.6 % (ref 19.6–45.3)
MCH RBC QN AUTO: 30.5 PG (ref 26.6–33)
MCHC RBC AUTO-ENTMCNC: 32.5 G/DL (ref 31.5–35.7)
MCV RBC AUTO: 93.8 FL (ref 79–97)
MONOCYTES # BLD AUTO: 0.3 10*3/MM3 (ref 0.1–0.9)
MONOCYTES NFR BLD AUTO: 6 % (ref 5–12)
NEUTROPHILS # BLD AUTO: 3.01 10*3/MM3 (ref 1.7–7)
NEUTROPHILS NFR BLD AUTO: 59.8 % (ref 42.7–76)
NRBC BLD AUTO-RTO: 0 /100 WBC (ref 0–0.2)
PLATELET # BLD AUTO: 253 10*3/MM3 (ref 140–450)
POTASSIUM SERPL-SCNC: 4.6 MMOL/L (ref 3.5–5.2)
PROGEST SERPL-MCNC: <0.1 NG/ML
PROT SERPL-MCNC: 7.2 G/DL (ref 6–8.5)
RBC # BLD AUTO: 4.52 10*6/MM3 (ref 3.77–5.28)
SODIUM SERPL-SCNC: 143 MMOL/L (ref 136–145)
TIBC SERPL-MCNC: 425 MCG/DL
TRIGL SERPL-MCNC: 55 MG/DL (ref 0–150)
TSH SERPL DL<=0.005 MIU/L-ACNC: 1.3 UIU/ML (ref 0.27–4.2)
UIBC SERPL-MCNC: 304 MCG/DL (ref 112–346)
VIT B12 SERPL-MCNC: 1088 PG/ML (ref 211–946)
VLDLC SERPL CALC-MCNC: 11 MG/DL (ref 5–40)
WBC # BLD AUTO: 5.03 10*3/MM3 (ref 3.4–10.8)